# Patient Record
Sex: FEMALE | Race: WHITE | URBAN - NONMETROPOLITAN AREA
[De-identification: names, ages, dates, MRNs, and addresses within clinical notes are randomized per-mention and may not be internally consistent; named-entity substitution may affect disease eponyms.]

---

## 2017-02-21 ENCOUNTER — OFFICE VISIT (OUTPATIENT)
Dept: FAMILY MEDICINE CLINIC | Facility: CLINIC | Age: 57
End: 2017-02-21

## 2017-02-21 VITALS
BODY MASS INDEX: 27.85 KG/M2 | TEMPERATURE: 98 F | HEART RATE: 64 BPM | HEIGHT: 63 IN | SYSTOLIC BLOOD PRESSURE: 120 MMHG | DIASTOLIC BLOOD PRESSURE: 80 MMHG | WEIGHT: 157.19 LBS

## 2017-02-21 DIAGNOSIS — L50.9 HIVES: Primary | ICD-10-CM

## 2017-02-21 PROCEDURE — 99214 OFFICE O/P EST MOD 30 MIN: CPT | Performed by: INTERNAL MEDICINE

## 2017-02-21 RX ORDER — PREDNISONE 20 MG/1
TABLET ORAL
Qty: 11 TABLET | Refills: 0 | Status: SHIPPED | OUTPATIENT
Start: 2017-02-21 | End: 2017-12-18

## 2017-02-21 NOTE — PROGRESS NOTES
Eugene Castañeda is a 56year old female. HPI:   Pt has a rash since Thursday last week 2/16/2017. She did eat a new salad and wore a bra unwashed, but that is the only thing out of the ordinary. It started on the chest and moved to the arms and legs. weeks check for thyroid disorder. No orders of the defined types were placed in this encounter.        Meds & Refills for this Visit:  Signed Prescriptions Disp Refills    predniSONE 20 MG Oral Tab 11 tablet 0      Si mg po q day x 3 days, 20 mg po q

## 2017-12-16 ENCOUNTER — TELEPHONE (OUTPATIENT)
Dept: FAMILY MEDICINE CLINIC | Facility: CLINIC | Age: 57
End: 2017-12-16

## 2017-12-16 RX ORDER — AMOXICILLIN AND CLAVULANATE POTASSIUM 875; 125 MG/1; MG/1
1 TABLET, FILM COATED ORAL 2 TIMES DAILY
Qty: 20 TABLET | Refills: 0 | Status: SHIPPED | OUTPATIENT
Start: 2017-12-16 | End: 2017-12-26

## 2017-12-16 RX ORDER — ACETAMINOPHEN AND CODEINE PHOSPHATE 300; 30 MG/1; MG/1
1 TABLET ORAL EVERY 4 HOURS PRN
Qty: 60 TABLET | Refills: 0 | Status: SHIPPED | OUTPATIENT
Start: 2017-12-16 | End: 2018-02-19 | Stop reason: ALTCHOICE

## 2017-12-16 NOTE — TELEPHONE ENCOUNTER
Using  motrin taking 4 tabs q 4 hours , applies warm moist paks \"occasionally \" advised 4 tabs q 4 hours  Too much will forward tto Dr Kang Meter for recommendations

## 2017-12-16 NOTE — TELEPHONE ENCOUNTER
Ansley Mcneil is coming Monday for a boil on top of her head can she get some pain pills for the pain?

## 2017-12-18 ENCOUNTER — OFFICE VISIT (OUTPATIENT)
Dept: FAMILY MEDICINE CLINIC | Facility: CLINIC | Age: 57
End: 2017-12-18

## 2017-12-18 ENCOUNTER — TELEPHONE (OUTPATIENT)
Dept: FAMILY MEDICINE CLINIC | Facility: CLINIC | Age: 57
End: 2017-12-18

## 2017-12-18 VITALS
SYSTOLIC BLOOD PRESSURE: 138 MMHG | OXYGEN SATURATION: 97 % | TEMPERATURE: 99 F | DIASTOLIC BLOOD PRESSURE: 88 MMHG | WEIGHT: 160 LBS | HEART RATE: 96 BPM | BODY MASS INDEX: 28 KG/M2

## 2017-12-18 DIAGNOSIS — L02.818 CUTANEOUS ABSCESS OF OTHER SITE: Primary | ICD-10-CM

## 2017-12-18 PROCEDURE — 99213 OFFICE O/P EST LOW 20 MIN: CPT | Performed by: INTERNAL MEDICINE

## 2017-12-18 NOTE — TELEPHONE ENCOUNTER
Patient wants to know what it is she has on her head and she thought Dr Daisy Marie mentioned something about MRSA.

## 2017-12-19 ENCOUNTER — OFFICE VISIT (OUTPATIENT)
Dept: FAMILY MEDICINE CLINIC | Facility: CLINIC | Age: 57
End: 2017-12-19

## 2017-12-19 VITALS
DIASTOLIC BLOOD PRESSURE: 90 MMHG | WEIGHT: 160 LBS | BODY MASS INDEX: 28 KG/M2 | SYSTOLIC BLOOD PRESSURE: 140 MMHG | TEMPERATURE: 99 F

## 2017-12-19 DIAGNOSIS — R22.0 SCALP MASS: Primary | ICD-10-CM

## 2017-12-19 DIAGNOSIS — L02.818 CUTANEOUS ABSCESS OF OTHER SITE: ICD-10-CM

## 2017-12-19 PROCEDURE — 88305 TISSUE EXAM BY PATHOLOGIST: CPT | Performed by: SURGERY

## 2017-12-19 PROCEDURE — 99242 OFF/OP CONSLTJ NEW/EST SF 20: CPT | Performed by: SURGERY

## 2017-12-19 PROCEDURE — 10060 I&D ABSCESS SIMPLE/SINGLE: CPT | Performed by: SURGERY

## 2017-12-19 NOTE — PROGRESS NOTES
Ju Hutton is a 56year old female. HPI:   Pt has a large abscess on the back of her head for the past week. It has decompressed some, but it is painful. She denies fevers and chills. She has had no trauma.      Current Outpatient Prescriptions: Visit:  No prescriptions requested or ordered in this encounter    Imaging & Consults:  None    Follow up or as needed. The patient indicates understanding of these issues and agrees to the plan.

## 2017-12-19 NOTE — TELEPHONE ENCOUNTER
She has an abscess casued by staff or strep and it may be MRSA when Dr Krystyna eLe drains it today he will culture it to find out.

## 2017-12-20 ENCOUNTER — TELEPHONE (OUTPATIENT)
Dept: FAMILY MEDICINE CLINIC | Facility: CLINIC | Age: 57
End: 2017-12-20

## 2017-12-20 NOTE — PROGRESS NOTES
[facility]  Progress Note    Sameul Pick Patient Status:  No patient class for patient encounter    1960 MRN [id number]   Location [address] [facility], [address] [facility] Attending No att. providers found   Hosp Day # 0 PCP Katrin Red to ensure resolution of this infection and to await pathology. I spent  45  minutes face to face with the patient. More than 50% of that time was spent counseling the patient and/or on coordination of care.  The diagnosis, prognosis, and general treatment

## 2018-02-16 ENCOUNTER — TELEPHONE (OUTPATIENT)
Dept: FAMILY MEDICINE CLINIC | Facility: CLINIC | Age: 58
End: 2018-02-16

## 2018-02-16 NOTE — TELEPHONE ENCOUNTER
Patient returned phone call and states that symptoms started a few weeks ago with itching, the last few days she has noticed a swelling feeling in the rectal area. She states that she did triple triple abx ointment on it, but did not notice any difference.

## 2018-02-19 ENCOUNTER — OFFICE VISIT (OUTPATIENT)
Dept: FAMILY MEDICINE CLINIC | Facility: CLINIC | Age: 58
End: 2018-02-19

## 2018-02-19 VITALS
OXYGEN SATURATION: 97 % | SYSTOLIC BLOOD PRESSURE: 146 MMHG | HEIGHT: 63 IN | TEMPERATURE: 97 F | HEART RATE: 82 BPM | WEIGHT: 158 LBS | BODY MASS INDEX: 28 KG/M2 | DIASTOLIC BLOOD PRESSURE: 94 MMHG

## 2018-02-19 DIAGNOSIS — K61.1 PERIRECTAL ABSCESS: Primary | ICD-10-CM

## 2018-02-19 PROCEDURE — 99214 OFFICE O/P EST MOD 30 MIN: CPT | Performed by: INTERNAL MEDICINE

## 2018-02-19 RX ORDER — MUPIROCIN CALCIUM 20 MG/G
1 CREAM TOPICAL DAILY
Qty: 15 G | Refills: 1 | Status: SHIPPED | OUTPATIENT
Start: 2018-02-19 | End: 2018-03-05

## 2018-02-19 RX ORDER — CEPHALEXIN 500 MG/1
500 CAPSULE ORAL 3 TIMES DAILY
Qty: 30 CAPSULE | Refills: 0 | Status: SHIPPED | OUTPATIENT
Start: 2018-02-19 | End: 2018-08-08 | Stop reason: ALTCHOICE

## 2018-02-19 NOTE — PROGRESS NOTES
Ervin Gavin is a 66year old female. HPI:   Pain and bleeding in the rectal area. She has been trying to keep clean and dry.      Current Outpatient Prescriptions:  cephALEXin (KEFLEX) 500 MG Oral Cap Take 1 capsule (500 mg total) by mouth 3 (three) placed in this encounter. Meds & Refills for this Visit:  Signed Prescriptions Disp Refills    cephALEXin (KEFLEX) 500 MG Oral Cap 30 capsule 0      Sig: Take 1 capsule (500 mg total) by mouth 3 (three) times daily.       Mupirocin Calcium 2 % External

## 2018-06-05 ENCOUNTER — OFFICE VISIT (OUTPATIENT)
Dept: FAMILY MEDICINE CLINIC | Facility: CLINIC | Age: 58
End: 2018-06-05

## 2018-06-05 DIAGNOSIS — L29.0 RECTAL ITCHING: Primary | ICD-10-CM

## 2018-06-05 PROCEDURE — 99214 OFFICE O/P EST MOD 30 MIN: CPT | Performed by: INTERNAL MEDICINE

## 2018-06-05 RX ORDER — NYSTATIN 100000 [USP'U]/G
1 POWDER TOPICAL 2 TIMES DAILY
Qty: 45 G | Refills: 0 | Status: SHIPPED | OUTPATIENT
Start: 2018-06-05 | End: 2018-06-15

## 2018-06-06 ENCOUNTER — TELEPHONE (OUTPATIENT)
Dept: FAMILY MEDICINE CLINIC | Facility: CLINIC | Age: 58
End: 2018-06-06

## 2018-06-06 VITALS
TEMPERATURE: 98 F | DIASTOLIC BLOOD PRESSURE: 82 MMHG | WEIGHT: 153.13 LBS | HEIGHT: 62.5 IN | BODY MASS INDEX: 27.48 KG/M2 | OXYGEN SATURATION: 96 % | SYSTOLIC BLOOD PRESSURE: 120 MMHG | HEART RATE: 74 BPM

## 2018-06-06 RX ORDER — CLOTRIMAZOLE 1 %
1 CREAM (GRAM) TOPICAL 2 TIMES DAILY
Qty: 40 G | Refills: 0 | Status: SHIPPED | OUTPATIENT
Start: 2018-06-06 | End: 2018-06-16

## 2018-06-06 RX ORDER — CLOTRIMAZOLE 1 %
1 CREAM (GRAM) TOPICAL 2 TIMES DAILY
Qty: 40 G | Refills: 0 | Status: SHIPPED | OUTPATIENT
Start: 2018-06-06 | End: 2018-06-06

## 2018-06-06 NOTE — PROGRESS NOTES
Angelica Irizarry is a 66year old female. HPI:   Pt has a raw and itchy rectum. She has resolution of the abscess from 2/2018, but since has been itching on and off. No blood in the stool or on the toilet paper. She has been using Desitin.   Two small [id number] UNIT/GM External Powder 45 g 0      Sig: Apply 1 Application topically 2 (two) times daily. Imaging & Consults:  None    Follow up in the next week if no resolution or as needed.        The patient indicates understanding of these issues an

## 2018-06-06 NOTE — TELEPHONE ENCOUNTER
HORTENCIA WAS GIVEN A POWDER FOR A YEAST INFECTION, SHE WENT TO PICK IT UP AND IT WAS RATHER EXPENSIVE, SHE WAS WONDERING IF THERE IS A GENERIC OR CREAM SHE CAN USE?

## 2018-06-06 NOTE — TELEPHONE ENCOUNTER
Pt. Asking for this script to be called to [facility] in [place] and cancel [facility] in [place].  She wants to pick it up at [facility]

## 2018-08-08 ENCOUNTER — OFFICE VISIT (OUTPATIENT)
Dept: FAMILY MEDICINE CLINIC | Facility: CLINIC | Age: 58
End: 2018-08-08
Payer: MEDICARE

## 2018-08-08 VITALS
SYSTOLIC BLOOD PRESSURE: 118 MMHG | DIASTOLIC BLOOD PRESSURE: 80 MMHG | BODY MASS INDEX: 25.99 KG/M2 | WEIGHT: 156 LBS | HEART RATE: 60 BPM | RESPIRATION RATE: 12 BRPM | TEMPERATURE: 98 F | HEIGHT: 65 IN

## 2018-08-08 DIAGNOSIS — Z00.00 WELL ADULT EXAM: Primary | ICD-10-CM

## 2018-08-08 PROCEDURE — 99396 PREV VISIT EST AGE 40-64: CPT | Performed by: INTERNAL MEDICINE

## 2018-08-08 NOTE — PROGRESS NOTES
HPI:   Mingo Marsh is a 66year old female who presents for a complete physical exam. Symptoms: denies discharge, itching, burning or dysuria, is menopausal. Patient complains of nothing, still not had her colonscopy.        There is no immunization h exertion  CARDIOVASCULAR: denies chest pain on exertion  GI: denies abdominal pain,denies heartburn  : denies dysuria, vaginal discharge or itching,periods regular   MUSCULOSKELETAL: denies back pain  NEURO: denies headaches  PSYCHE: denies depression or asked to return for CPX in 1 year.

## 2018-10-18 ENCOUNTER — TELEPHONE (OUTPATIENT)
Dept: FAMILY MEDICINE CLINIC | Facility: CLINIC | Age: 58
End: 2018-10-18

## 2018-10-18 NOTE — TELEPHONE ENCOUNTER
Pt is scheduled for a mammo at [facility] at 3 today. They want previous images, if any, sent over.

## 2018-10-18 NOTE — TELEPHONE ENCOUNTER
Patient advised that we do not have the films, she would have to contact [facility] and [facility] where she had the mammogram done.

## 2019-10-29 ENCOUNTER — OFFICE VISIT (OUTPATIENT)
Dept: FAMILY MEDICINE CLINIC | Facility: CLINIC | Age: 59
End: 2019-10-29
Payer: COMMERCIAL

## 2019-10-29 ENCOUNTER — LAB ENCOUNTER (OUTPATIENT)
Dept: LAB | Age: 59
End: 2019-10-29
Attending: INTERNAL MEDICINE
Payer: COMMERCIAL

## 2019-10-29 VITALS
HEIGHT: 63 IN | TEMPERATURE: 98 F | SYSTOLIC BLOOD PRESSURE: 130 MMHG | RESPIRATION RATE: 16 BRPM | OXYGEN SATURATION: 99 % | BODY MASS INDEX: 27.82 KG/M2 | DIASTOLIC BLOOD PRESSURE: 70 MMHG | WEIGHT: 157 LBS | HEART RATE: 70 BPM

## 2019-10-29 DIAGNOSIS — Z11.51 SCREENING FOR HUMAN PAPILLOMAVIRUS (HPV): ICD-10-CM

## 2019-10-29 DIAGNOSIS — R30.0 DYSURIA: ICD-10-CM

## 2019-10-29 DIAGNOSIS — Z12.4 PAP SMEAR FOR CERVICAL CANCER SCREENING: ICD-10-CM

## 2019-10-29 DIAGNOSIS — Z00.00 WELL ADULT EXAM: Primary | ICD-10-CM

## 2019-10-29 DIAGNOSIS — Z00.00 WELL ADULT EXAM: ICD-10-CM

## 2019-10-29 DIAGNOSIS — Z12.11 SCREENING FOR COLON CANCER: ICD-10-CM

## 2019-10-29 PROCEDURE — 88175 CYTOPATH C/V AUTO FLUID REDO: CPT | Performed by: INTERNAL MEDICINE

## 2019-10-29 PROCEDURE — 85025 COMPLETE CBC W/AUTO DIFF WBC: CPT | Performed by: INTERNAL MEDICINE

## 2019-10-29 PROCEDURE — 81003 URINALYSIS AUTO W/O SCOPE: CPT | Performed by: INTERNAL MEDICINE

## 2019-10-29 PROCEDURE — 99396 PREV VISIT EST AGE 40-64: CPT | Performed by: INTERNAL MEDICINE

## 2019-10-29 PROCEDURE — 80053 COMPREHEN METABOLIC PANEL: CPT | Performed by: INTERNAL MEDICINE

## 2019-10-29 PROCEDURE — 36415 COLL VENOUS BLD VENIPUNCTURE: CPT | Performed by: INTERNAL MEDICINE

## 2019-10-29 PROCEDURE — 87624 HPV HI-RISK TYP POOLED RSLT: CPT | Performed by: INTERNAL MEDICINE

## 2019-10-29 PROCEDURE — 80061 LIPID PANEL: CPT | Performed by: INTERNAL MEDICINE

## 2019-10-29 RX ORDER — NITROFURANTOIN 25; 75 MG/1; MG/1
100 CAPSULE ORAL 2 TIMES DAILY
COMMUNITY
End: 2020-01-08 | Stop reason: ALTCHOICE

## 2019-10-29 NOTE — PROGRESS NOTES
HPI:   Kavita Jolley is a 64year old female who presents for a complete physical exam. Symptoms: denies discharge, itching, burning or dysuria, is menopausal, has dysuria, treated for UTI recently. . Patient complains of nothing.        There is no imm GENERAL: feels well otherwise  SKIN: denies any unusual skin lesions  EYES:denies blurred vision or double vision  HEENT: denies nasal congestion, sinus pain or ST  LUNGS: denies shortness of breath with exertion  CARDIOVASCULAR: denies chest pain on exe diet and aerobic exercise 30 minutes three times weekly. The patient indicates understanding of these issues and agrees to the plan. The patient is asked to return for CPX in 1 year.

## 2019-11-27 ENCOUNTER — MED REC SCAN ONLY (OUTPATIENT)
Dept: FAMILY MEDICINE CLINIC | Facility: CLINIC | Age: 59
End: 2019-11-27

## 2019-11-27 DIAGNOSIS — Z00.00 WELL ADULT EXAM: ICD-10-CM

## 2020-01-08 ENCOUNTER — OFFICE VISIT (OUTPATIENT)
Dept: FAMILY MEDICINE CLINIC | Facility: CLINIC | Age: 60
End: 2020-01-08
Payer: COMMERCIAL

## 2020-01-08 VITALS
BODY MASS INDEX: 27.11 KG/M2 | DIASTOLIC BLOOD PRESSURE: 90 MMHG | HEIGHT: 63 IN | TEMPERATURE: 98 F | HEART RATE: 76 BPM | WEIGHT: 153 LBS | SYSTOLIC BLOOD PRESSURE: 150 MMHG | RESPIRATION RATE: 20 BRPM

## 2020-01-08 DIAGNOSIS — R31.9 HEMATURIA, UNSPECIFIED TYPE: Primary | ICD-10-CM

## 2020-01-08 LAB
APPEARANCE: CLEAR
BILIRUBIN: NEGATIVE
GLUCOSE (URINE DIPSTICK): NEGATIVE MG/DL
MULTISTIX LOT#: NORMAL NUMERIC
NITRITE, URINE: NEGATIVE
PH, URINE: 6.5 (ref 4.5–8)
SPECIFIC GRAVITY: 1.02 (ref 1–1.03)
URINE-COLOR: YELLOW
UROBILINOGEN,SEMI-QN: 0.2 MG/DL (ref 0–1.9)

## 2020-01-08 PROCEDURE — 99214 OFFICE O/P EST MOD 30 MIN: CPT | Performed by: INTERNAL MEDICINE

## 2020-01-08 PROCEDURE — 87088 URINE BACTERIA CULTURE: CPT | Performed by: INTERNAL MEDICINE

## 2020-01-08 PROCEDURE — 87186 SC STD MICRODIL/AGAR DIL: CPT | Performed by: INTERNAL MEDICINE

## 2020-01-08 PROCEDURE — 87086 URINE CULTURE/COLONY COUNT: CPT | Performed by: INTERNAL MEDICINE

## 2020-01-08 PROCEDURE — 81003 URINALYSIS AUTO W/O SCOPE: CPT | Performed by: INTERNAL MEDICINE

## 2020-01-08 RX ORDER — CIPROFLOXACIN 500 MG/1
500 TABLET, FILM COATED ORAL 2 TIMES DAILY
Qty: 20 TABLET | Refills: 0 | Status: SHIPPED | OUTPATIENT
Start: 2020-01-08 | End: 2020-01-18

## 2020-01-08 NOTE — PROGRESS NOTES
Sarath Moore is a 64year old female. HPI:   Patient presents with symptoms of UTI. Complaining of urinary urgency, no hematuria. Denies back pain and no fever.   Had a Macrobd then No Morales for a UTI in December treated though walk in, culture was not

## 2020-02-12 ENCOUNTER — OFFICE VISIT (OUTPATIENT)
Dept: FAMILY MEDICINE CLINIC | Facility: CLINIC | Age: 60
End: 2020-02-12
Payer: COMMERCIAL

## 2020-02-12 VITALS
DIASTOLIC BLOOD PRESSURE: 90 MMHG | TEMPERATURE: 97 F | WEIGHT: 153 LBS | SYSTOLIC BLOOD PRESSURE: 140 MMHG | RESPIRATION RATE: 20 BRPM | BODY MASS INDEX: 27 KG/M2 | HEART RATE: 76 BPM

## 2020-02-12 DIAGNOSIS — R31.9 HEMATURIA, UNSPECIFIED TYPE: Primary | ICD-10-CM

## 2020-02-12 LAB
BILIRUBIN: NEGATIVE
GLUCOSE (URINE DIPSTICK): NEGATIVE MG/DL
KETONES (URINE DIPSTICK): NEGATIVE MG/DL
LEUKOCYTES: NEGATIVE
MULTISTIX LOT#: NORMAL NUMERIC
NITRITE, URINE: NEGATIVE
PH, URINE: 6 (ref 4.5–8)
SPECIFIC GRAVITY: 1.02 (ref 1–1.03)
UROBILINOGEN,SEMI-QN: 0.2 MG/DL (ref 0–1.9)

## 2020-02-12 PROCEDURE — 87086 URINE CULTURE/COLONY COUNT: CPT | Performed by: INTERNAL MEDICINE

## 2020-02-12 PROCEDURE — 81003 URINALYSIS AUTO W/O SCOPE: CPT | Performed by: INTERNAL MEDICINE

## 2020-02-12 PROCEDURE — 99214 OFFICE O/P EST MOD 30 MIN: CPT | Performed by: INTERNAL MEDICINE

## 2020-02-12 RX ORDER — LISINOPRIL 20 MG/1
20 TABLET ORAL DAILY
Qty: 90 TABLET | Refills: 0 | Status: SHIPPED | OUTPATIENT
Start: 2020-02-12 | End: 2020-12-02

## 2020-02-12 NOTE — PROGRESS NOTES
Angelica Irizarry is a 64year old female. HPI:   Patient presents for recheck of her hypertension. Pt has been taking medications as instructed, no medication side effects, home BP monitoring in the range of 818-513'H systolic and 47-88'N diastolic. exertion  CARDIOVASCULAR: denies chest pain on exertion  GI: denies abdominal pain and denies heartburn  NEURO: denies headaches    EXAM:   /90 (BP Location: Right arm, Patient Position: Sitting, Cuff Size: adult)   Pulse 76   Temp 97.2 °F (36.2 °C)

## 2020-03-23 ENCOUNTER — TELEPHONE (OUTPATIENT)
Dept: FAMILY MEDICINE CLINIC | Facility: CLINIC | Age: 60
End: 2020-03-23

## 2020-03-23 NOTE — TELEPHONE ENCOUNTER
Called Dr. Nancie Grider office. They are requesting last chart notes plus any recent labs pertaining to urology. Will fax OV notes from 2/12/20 and labs from 1/8/20 and 2/12/20. Faxed to [phone]. Called patient to let her know it was done.

## 2020-03-23 NOTE — TELEPHONE ENCOUNTER
Pt is going to see a urologist and needs her records faxed over. Dr Jaja Stubbs in [facility] in [facility].  Ph# [phone]

## 2020-05-28 ENCOUNTER — TELEPHONE (OUTPATIENT)
Dept: FAMILY MEDICINE CLINIC | Facility: CLINIC | Age: 60
End: 2020-05-28

## 2020-10-26 ENCOUNTER — OFFICE VISIT (OUTPATIENT)
Dept: FAMILY MEDICINE CLINIC | Facility: CLINIC | Age: 60
End: 2020-10-26
Payer: COMMERCIAL

## 2020-10-26 ENCOUNTER — LAB ENCOUNTER (OUTPATIENT)
Dept: LAB | Age: 60
End: 2020-10-26
Attending: INTERNAL MEDICINE
Payer: COMMERCIAL

## 2020-10-26 ENCOUNTER — TELEPHONE (OUTPATIENT)
Dept: FAMILY MEDICINE CLINIC | Facility: CLINIC | Age: 60
End: 2020-10-26

## 2020-10-26 VITALS
WEIGHT: 155 LBS | SYSTOLIC BLOOD PRESSURE: 118 MMHG | DIASTOLIC BLOOD PRESSURE: 60 MMHG | HEART RATE: 82 BPM | TEMPERATURE: 97 F | RESPIRATION RATE: 16 BRPM | BODY MASS INDEX: 27.46 KG/M2 | OXYGEN SATURATION: 98 % | HEIGHT: 63 IN

## 2020-10-26 DIAGNOSIS — D22.9 BENIGN MOLE: ICD-10-CM

## 2020-10-26 DIAGNOSIS — R00.2 PALPITATIONS: Primary | ICD-10-CM

## 2020-10-26 DIAGNOSIS — H93.8X1 EAR FULLNESS, RIGHT: ICD-10-CM

## 2020-10-26 DIAGNOSIS — R00.2 PALPITATIONS: ICD-10-CM

## 2020-10-26 PROCEDURE — 93000 ELECTROCARDIOGRAM COMPLETE: CPT | Performed by: INTERNAL MEDICINE

## 2020-10-26 PROCEDURE — 80050 GENERAL HEALTH PANEL: CPT | Performed by: INTERNAL MEDICINE

## 2020-10-26 PROCEDURE — 3074F SYST BP LT 130 MM HG: CPT | Performed by: INTERNAL MEDICINE

## 2020-10-26 PROCEDURE — 3008F BODY MASS INDEX DOCD: CPT | Performed by: INTERNAL MEDICINE

## 2020-10-26 PROCEDURE — 36415 COLL VENOUS BLD VENIPUNCTURE: CPT | Performed by: INTERNAL MEDICINE

## 2020-10-26 PROCEDURE — 99214 OFFICE O/P EST MOD 30 MIN: CPT | Performed by: INTERNAL MEDICINE

## 2020-10-26 PROCEDURE — 84439 ASSAY OF FREE THYROXINE: CPT | Performed by: INTERNAL MEDICINE

## 2020-10-26 PROCEDURE — 3078F DIAST BP <80 MM HG: CPT | Performed by: INTERNAL MEDICINE

## 2020-10-26 RX ORDER — AMOXICILLIN AND CLAVULANATE POTASSIUM 875; 125 MG/1; MG/1
1 TABLET, FILM COATED ORAL 2 TIMES DAILY
Qty: 20 TABLET | Refills: 0 | Status: SHIPPED | OUTPATIENT
Start: 2020-10-26 | End: 2020-11-05

## 2020-10-26 RX ORDER — PREDNISONE 20 MG/1
20 TABLET ORAL DAILY
Qty: 7 TABLET | Refills: 0 | Status: SHIPPED | OUTPATIENT
Start: 2020-10-26 | End: 2020-11-02

## 2020-10-26 NOTE — PROGRESS NOTES
Meg Galarza is a 64year old female. HPI:   Pt has a few issues. First, there is a dull pain in the right ear for the last several months. She saw a doctor in her area, but she thought it was allergies and not improved with antihistamine everyday. clear to auscultation  CARDIO: RRR without murmur  GI: good BS's,no masses, HSM or tenderness  EXTREMITIES: no cyanosis, clubbing or edema    ASSESSMENT AND PLAN:     Palpitations  (primary encounter diagnosis)  Ear fullness, right  Benign mole  EKG normal

## 2020-10-28 ENCOUNTER — TELEPHONE (OUTPATIENT)
Dept: FAMILY MEDICINE CLINIC | Facility: CLINIC | Age: 60
End: 2020-10-28

## 2020-10-28 DIAGNOSIS — R00.2 PALPITATIONS: Primary | ICD-10-CM

## 2020-10-28 NOTE — TELEPHONE ENCOUNTER
LM for patient CPT code is [id number] and Dx is palpitations. Patient said that she doesn't think her insurance will pay for it because they need to meet their deductible first. She will speak with her .

## 2020-11-02 ENCOUNTER — TELEPHONE (OUTPATIENT)
Dept: FAMILY MEDICINE CLINIC | Facility: CLINIC | Age: 60
End: 2020-11-02

## 2020-11-02 RX ORDER — OMEPRAZOLE 40 MG/1
40 CAPSULE, DELAYED RELEASE ORAL DAILY
Qty: 60 CAPSULE | Refills: 0 | Status: SHIPPED | OUTPATIENT
Start: 2020-11-02 | End: 2021-03-01

## 2020-11-02 NOTE — TELEPHONE ENCOUNTER
HAS APPT 11/5 WITH DR FOR PAP ONLY, BUT IS GETTING HEART MONITOR PUT ON TOMORROW & SHE WILL STILL HAVE IT ON FOR PAP APPT, IS THIS OK OR SHOULD SHE RESCHED PAP APPT?

## 2020-11-02 NOTE — TELEPHONE ENCOUNTER
Patient was calling back to advise that she wants to go to [facility] in [place] for her heart monitor. Any questions please call the patient back. Please call patient as she has additional questions.

## 2020-11-02 NOTE — TELEPHONE ENCOUNTER
Patient advised that the order was faxed to to [facility] scheduling. She said that she has been having more acid reflux with burning. She said she can't tell if she has pressure in her chest, she denies pain or nausea.  She said she said she does get swea

## 2020-11-05 ENCOUNTER — OFFICE VISIT (OUTPATIENT)
Dept: FAMILY MEDICINE CLINIC | Facility: CLINIC | Age: 60
End: 2020-11-05
Payer: COMMERCIAL

## 2020-11-05 VITALS
SYSTOLIC BLOOD PRESSURE: 110 MMHG | DIASTOLIC BLOOD PRESSURE: 70 MMHG | BODY MASS INDEX: 27.46 KG/M2 | OXYGEN SATURATION: 98 % | HEART RATE: 75 BPM | HEIGHT: 63 IN | RESPIRATION RATE: 16 BRPM | TEMPERATURE: 98 F | WEIGHT: 155 LBS

## 2020-11-05 DIAGNOSIS — Z00.00 WELL ADULT EXAM: Primary | ICD-10-CM

## 2020-11-05 PROCEDURE — 99396 PREV VISIT EST AGE 40-64: CPT | Performed by: INTERNAL MEDICINE

## 2020-11-05 PROCEDURE — 3074F SYST BP LT 130 MM HG: CPT | Performed by: INTERNAL MEDICINE

## 2020-11-05 PROCEDURE — 3008F BODY MASS INDEX DOCD: CPT | Performed by: INTERNAL MEDICINE

## 2020-11-05 PROCEDURE — 88175 CYTOPATH C/V AUTO FLUID REDO: CPT | Performed by: INTERNAL MEDICINE

## 2020-11-05 PROCEDURE — 99072 ADDL SUPL MATRL&STAF TM PHE: CPT | Performed by: INTERNAL MEDICINE

## 2020-11-05 PROCEDURE — 3078F DIAST BP <80 MM HG: CPT | Performed by: INTERNAL MEDICINE

## 2020-11-05 NOTE — PROGRESS NOTES
HPI:   Garrett Bautista is a 64year old female who presents for a complete physical exam. Symptoms: denies discharge, itching, burning or dysuria, is menopausal. Patient is wearing a holter monitor due to increased heart rate.        There is no immunizat Alcohol use: Yes      Alcohol/week: 0.0 standard drinks      Frequency: 4 or more times a week      Comment: drink per night and social    Drug use: No    Occ: teacher, special ed. : yes. Children: yes. Exercise: three times per week.   Diet: watch presents for a complete physical exam.  Pap and pelvic done. Order put in for mammogram and dexascan. Self breast exam explained. Health maintenance, will check fasting Lipids, CMP, and CBC. Pt referred for screening colonoscopy.  Pt info handouts given for

## 2020-11-09 ENCOUNTER — TELEPHONE (OUTPATIENT)
Dept: FAMILY MEDICINE CLINIC | Facility: CLINIC | Age: 60
End: 2020-11-09

## 2020-11-16 ENCOUNTER — TELEPHONE (OUTPATIENT)
Dept: FAMILY MEDICINE CLINIC | Facility: CLINIC | Age: 60
End: 2020-11-16

## 2020-11-16 DIAGNOSIS — I48.91 ATRIAL FIBRILLATION, NEW ONSET (HCC): ICD-10-CM

## 2020-11-16 DIAGNOSIS — R10.11 RIGHT UPPER QUADRANT PAIN: Primary | ICD-10-CM

## 2020-11-16 NOTE — TELEPHONE ENCOUNTER
No results as yet. Patient advised. She had it done @ [facility] in [place].

## 2020-11-19 NOTE — TELEPHONE ENCOUNTER
US order faxed to [facility]. Patient said that she is going to see Dr Rossi Perkins at [facility]. Referral, holter results and EKG faxed to Dr Adam Garza [phone].

## 2020-11-19 NOTE — TELEPHONE ENCOUNTER
Mail box full. Holter monitor showed A-fib. She needs to take an Aspirin 325mg daily and see an EP cardiologist near where she lives.  IMER Altman RN

## 2020-11-19 NOTE — TELEPHONE ENCOUNTER
Patient advised. She said she will find an EP cardiologist near her and will let us know so we can fax results. Patient said that she has had an are under her right rib that sticks out and she feels \"bloated\".  She denies pain but wants to know if she jess

## 2020-11-19 NOTE — TELEPHONE ENCOUNTER
Patient is returning a call from [facility]. Please call patient back and it is okay to leave a detailed voicemail if she does not answer. Patient is going to clear her voicemail on her phone.

## 2020-12-02 RX ORDER — LISINOPRIL 20 MG/1
20 TABLET ORAL DAILY
Qty: 90 TABLET | Refills: 0 | Status: SHIPPED | OUTPATIENT
Start: 2020-12-02 | End: 2021-04-17

## 2020-12-02 NOTE — TELEPHONE ENCOUNTER
I have the results of the U/S and it looks normal. No GB disease and just a small liver, but not clinically significant. Is she still having pain?

## 2020-12-02 NOTE — TELEPHONE ENCOUNTER
Patient advised. She said that she is not having pain, just reflux. She said that the medication Dr Reza Vitale prescribed is helping with that. She said she needs a refill on her Lisinopril 20mg.

## 2020-12-03 ENCOUNTER — MED REC SCAN ONLY (OUTPATIENT)
Dept: FAMILY MEDICINE CLINIC | Facility: CLINIC | Age: 60
End: 2020-12-03

## 2020-12-15 ENCOUNTER — TELEPHONE (OUTPATIENT)
Dept: FAMILY MEDICINE CLINIC | Facility: CLINIC | Age: 60
End: 2020-12-15

## 2020-12-15 DIAGNOSIS — H92.01 RIGHT EAR PAIN: Primary | ICD-10-CM

## 2020-12-15 NOTE — TELEPHONE ENCOUNTER
Patient said that she has the name of the doctor at home.  She will call us back so we can place the referral.

## 2020-12-15 NOTE — TELEPHONE ENCOUNTER
LM for patient to [facility], she should call the number on the back of her card to see who is in network close to her home. Dr Neha Benoit can place a referral at that time.

## 2020-12-15 NOTE — TELEPHONE ENCOUNTER
Pt. Asking for ref. To ENT for her ear/she said she saw Dr. Neha Benoit a while back about this. She is asking for a ref. To a doctor closer to her home.

## 2020-12-15 NOTE — TELEPHONE ENCOUNTER
Patient said that she continues to have ear pain in her right ear since March that Dr Melanie Flynn treated her for. She said it never got better.

## 2020-12-16 NOTE — TELEPHONE ENCOUNTER
OV notes from 10/26/20 faxed to Dr Sylwia Eisenberg [phone] per their request for patient continuation of care.

## 2021-02-01 ENCOUNTER — MED REC SCAN ONLY (OUTPATIENT)
Dept: FAMILY MEDICINE CLINIC | Facility: CLINIC | Age: 61
End: 2021-02-01

## 2021-03-01 ENCOUNTER — TELEPHONE (OUTPATIENT)
Dept: FAMILY MEDICINE CLINIC | Facility: CLINIC | Age: 61
End: 2021-03-01

## 2021-03-01 RX ORDER — OMEPRAZOLE 40 MG/1
40 CAPSULE, DELAYED RELEASE ORAL DAILY
Qty: 90 CAPSULE | Refills: 0 | Status: SHIPPED | OUTPATIENT
Start: 2021-03-01 | End: 2021-07-26

## 2021-04-17 RX ORDER — LISINOPRIL 20 MG/1
TABLET ORAL
Qty: 90 TABLET | Refills: 0 | Status: SHIPPED | OUTPATIENT
Start: 2021-04-17 | End: 2021-08-25

## 2021-04-17 NOTE — TELEPHONE ENCOUNTER
Last office visit: 11/5/20  Last refill: 12/2/20  Last labs: 10/26/20  No future appointments.    Name from pharmacy: Lisinopril 20 MG Oral Tablet         Will file in chart as: LISINOPRIL 20 MG Oral Tab    Sig: Take 1 tablet by mouth once daily    Disp:  9

## 2021-04-19 RX ORDER — LISINOPRIL 20 MG/1
TABLET ORAL
Qty: 90 TABLET | Refills: 0 | OUTPATIENT
Start: 2021-04-19

## 2021-07-26 RX ORDER — OMEPRAZOLE 40 MG/1
40 CAPSULE, DELAYED RELEASE ORAL DAILY
Qty: 90 CAPSULE | Refills: 0 | Status: SHIPPED | OUTPATIENT
Start: 2021-07-26 | End: 2021-12-13

## 2021-07-26 NOTE — TELEPHONE ENCOUNTER
Last refill #90 on 3/1/2021  Last office visit pertaining to refill on 11/5/2020  No future appointments.

## 2021-08-25 ENCOUNTER — TELEPHONE (OUTPATIENT)
Dept: FAMILY MEDICINE CLINIC | Facility: CLINIC | Age: 61
End: 2021-08-25

## 2021-08-25 RX ORDER — LISINOPRIL 20 MG/1
TABLET ORAL
Qty: 90 TABLET | Refills: 0 | Status: SHIPPED | OUTPATIENT
Start: 2021-08-25

## 2021-08-25 NOTE — TELEPHONE ENCOUNTER
LOV 11/05/2020    LAST LAB 10/26/2020    LAST RX 04/17/2021 90 tablets0 refills    Next OV No future appointments.     PROTOCOLHypertension Medications Protocol [id number]/25/2021 02:33 PM   Appointment in past 6 or next 3 months Protocol Details    CMP or BM Pulmonology

## 2021-12-13 RX ORDER — OMEPRAZOLE 40 MG/1
40 CAPSULE, DELAYED RELEASE ORAL DAILY
Qty: 90 CAPSULE | Refills: 0 | Status: SHIPPED | OUTPATIENT
Start: 2021-12-13 | End: 2022-03-13

## 2021-12-13 NOTE — TELEPHONE ENCOUNTER
Patient has not been seen here since 11/5/20. No future appointments scheduled. Last refill 7/26/21. LM for patient to CB. She needs to make an appointment with Dr Guzman Lozano.

## 2021-12-13 NOTE — TELEPHONE ENCOUNTER
Patient said that they moved 1 1/2 hours away. She said that she has not found another doctor there. Advised that she will either need to find another provider or make an appointment with Dr Elke Serrano before the next refill is due.

## 2022-06-29 ENCOUNTER — MED REC SCAN ONLY (OUTPATIENT)
Dept: FAMILY MEDICINE CLINIC | Facility: CLINIC | Age: 62
End: 2022-06-29

## 2023-03-06 ENCOUNTER — PATIENT OUTREACH (OUTPATIENT)
Dept: FAMILY MEDICINE CLINIC | Facility: CLINIC | Age: 63
End: 2023-03-06

## 2023-03-21 ENCOUNTER — TELEPHONE (OUTPATIENT)
Dept: FAMILY MEDICINE CLINIC | Facility: CLINIC | Age: 63
End: 2023-03-21

## 2023-03-21 NOTE — TELEPHONE ENCOUNTER
Last office visit - 11/5/20  Per telephone encounter of 12/13/21 - patient has moved away. Will find another PCP or make appointment. Patient did not make appointment.

## 2023-03-24 ENCOUNTER — PATIENT OUTREACH (OUTPATIENT)
Dept: CASE MANAGEMENT | Age: 63
End: 2023-03-24

## 2023-03-24 NOTE — PROCEDURES
The office order for PCP removal request is Approved and finalized on March 24, 2023.     Thanks,  [facility] [employer]

## 2023-05-25 ENCOUNTER — MED REC SCAN ONLY (OUTPATIENT)
Dept: FAMILY MEDICINE CLINIC | Facility: CLINIC | Age: 63
End: 2023-05-25

## (undated) NOTE — MR AVS SNAPSHOT
After Visit Summary   11/5/2020    Chelsea Moses    MRN: [id number]           Visit Information     Date & Time  11/5/2020 10:30 AM Provider  Mariam Segura MD [address] [place] [address] Dept.  Phone  [phone]. Enter your [employer] Activation Code exactly as it appears below. You will not need to use this code after you have completed the sign-up process. If you do not sign up before the expiration date, you must request a new code.     [employer] Activation Code: 4 non-emergency, consider your options before heading to an ER. VIDEO VISITS  Visit face-to-face with a [facility] physician or   [facility] using your mobile device or computer   using [employer].    e-VISITS  Communicate with a [facility] Physician or [facility]IONEL online.  The physician dani

## (undated) NOTE — LETTER
12/04/19        [address] [place] [address]      Dear Marcy Naranjo,    [id number] Located within [facility] records indicate that you have outstanding lab work and or testing that was ordered for you and has not yet been completed:  Orders Placed This Encounter

## (undated) NOTE — MR AVS SNAPSHOT
[facility]  [address] [facility] [address]  [phone]               Thank you for choosing us for your health care visit with Last Hernandez MD.  We are glad to serve you and happy to provide you with this summary o discharge instructions in [employer] by going to Visits < Admission Summaries. If you've been to the Emergency Department or your doctor's office, you can view your past visit information in [employer] by going to Visits < Visit Summaries. Tatango questions?